# Patient Record
Sex: FEMALE | Race: WHITE | NOT HISPANIC OR LATINO | Employment: OTHER | URBAN - NONMETROPOLITAN AREA
[De-identification: names, ages, dates, MRNs, and addresses within clinical notes are randomized per-mention and may not be internally consistent; named-entity substitution may affect disease eponyms.]

---

## 2024-03-28 ENCOUNTER — OFFICE VISIT (OUTPATIENT)
Dept: CARDIOLOGY | Facility: CLINIC | Age: 79
End: 2024-03-28
Payer: MEDICARE

## 2024-03-28 ENCOUNTER — TELEPHONE (OUTPATIENT)
Dept: CARDIOLOGY | Facility: CLINIC | Age: 79
End: 2024-03-28

## 2024-03-28 VITALS
BODY MASS INDEX: 35.03 KG/M2 | HEIGHT: 66 IN | DIASTOLIC BLOOD PRESSURE: 70 MMHG | SYSTOLIC BLOOD PRESSURE: 170 MMHG | HEART RATE: 67 BPM | WEIGHT: 218 LBS

## 2024-03-28 DIAGNOSIS — E78.2 MIXED HYPERLIPIDEMIA: ICD-10-CM

## 2024-03-28 DIAGNOSIS — I48.91 ATRIAL FIBRILLATION, UNSPECIFIED TYPE (MULTI): ICD-10-CM

## 2024-03-28 DIAGNOSIS — R07.9 CHEST PAIN IN ADULT: ICD-10-CM

## 2024-03-28 DIAGNOSIS — Z78.9 NEVER SMOKED TOBACCO: ICD-10-CM

## 2024-03-28 DIAGNOSIS — R06.02 SHORTNESS OF BREATH: ICD-10-CM

## 2024-03-28 DIAGNOSIS — I10 ESSENTIAL HYPERTENSION: ICD-10-CM

## 2024-03-28 DIAGNOSIS — E44.1: ICD-10-CM

## 2024-03-28 PROBLEM — E78.5 HYPERLIPIDEMIA: Status: ACTIVE | Noted: 2024-03-28

## 2024-03-28 PROCEDURE — 99204 OFFICE O/P NEW MOD 45 MIN: CPT | Performed by: INTERNAL MEDICINE

## 2024-03-28 PROCEDURE — 93000 ELECTROCARDIOGRAM COMPLETE: CPT | Performed by: INTERNAL MEDICINE

## 2024-03-28 PROCEDURE — 1036F TOBACCO NON-USER: CPT | Performed by: INTERNAL MEDICINE

## 2024-03-28 PROCEDURE — 3078F DIAST BP <80 MM HG: CPT | Performed by: INTERNAL MEDICINE

## 2024-03-28 PROCEDURE — 3077F SYST BP >= 140 MM HG: CPT | Performed by: INTERNAL MEDICINE

## 2024-03-28 PROCEDURE — 1159F MED LIST DOCD IN RCRD: CPT | Performed by: INTERNAL MEDICINE

## 2024-03-28 RX ORDER — AMLODIPINE BESYLATE 10 MG/1
10 TABLET ORAL DAILY
Qty: 90 TABLET | Refills: 3 | Status: SHIPPED | OUTPATIENT
Start: 2024-03-28 | End: 2025-03-28

## 2024-03-28 RX ORDER — ASPIRIN 81 MG/1
81 TABLET ORAL DAILY
Qty: 90 TABLET | Refills: 3 | Status: SHIPPED | OUTPATIENT
Start: 2024-03-28 | End: 2025-03-28

## 2024-03-28 RX ORDER — SERTRALINE HYDROCHLORIDE 50 MG/1
50 TABLET, FILM COATED ORAL NIGHTLY
COMMUNITY

## 2024-03-28 RX ORDER — NAPROXEN 500 MG/1
500 TABLET ORAL
COMMUNITY
Start: 2024-03-14

## 2024-03-28 RX ORDER — AMLODIPINE BESYLATE 5 MG/1
5 TABLET ORAL DAILY
COMMUNITY
End: 2024-03-28 | Stop reason: DRUGHIGH

## 2024-03-28 RX ORDER — LISINOPRIL 20 MG/1
20 TABLET ORAL DAILY
COMMUNITY

## 2024-03-28 RX ORDER — OMEPRAZOLE 40 MG/1
40 CAPSULE, DELAYED RELEASE ORAL
COMMUNITY

## 2024-03-28 RX ORDER — TIZANIDINE 4 MG/1
4 TABLET ORAL DAILY PRN
COMMUNITY
Start: 2024-03-14 | End: 2024-06-12

## 2024-03-28 RX ORDER — TRIAMTERENE/HYDROCHLOROTHIAZID 37.5-25 MG
1 TABLET ORAL DAILY
COMMUNITY

## 2024-03-28 ASSESSMENT — ENCOUNTER SYMPTOMS: DYSPNEA ON EXERTION: 1

## 2024-03-28 NOTE — PROGRESS NOTES
Cardiology Consultation- New Consult    Reason for referral: Aortic Atherosclerotic Calcification    HPI: Melania Mora is a 78 y.o. female who is self-referred for evaluation of abnormal imaging study showing calcification of the abdominal aorta.  The patient comes from a family with high prevalence of coronary heart disease, her father had coronary bypass surgery when he was in the 60s.  She has a sister with atrial fibrillation.  She does have longstanding history of hypertension medical therapy.  She is nondiabetic and non-smoker lifetime.  She has significant hyperlipidemia and in the past tried taking 1 statin causing leg pain leading to discontinuation of the medicine.  She denies any symptoms of chest pain on exertion, dyspnea on exertion, palpitations orthopnea PND or lower extremity edema has had no syncope.  I found on the record in 2017 a Lexiscan Cardiolite stress test.  I read the ECG part of the test which revealed atrial fibrillation that was present at baseline.  She was not aware of it.  Apparently this was never addressed by her.  I believe the stress test came back normal at the time since no action was taken.  All other review of system essentially unremarkable the physical examination was remarkable for class I obesity.  Her pressure was elevated today with a systolic blood pressure 170 mmHg.  She seem to have been tolerating hypertension therapy fairly well.    Assessment/recommendations:    1-hypertension that is uncontrolled.  Will increase amlodipine up to 10 mg daily.  2-paroxysmal atrial fibrillation confirmed by a stress test in 2017.  The patient was advised that since she is asymptomatic she is at risk of having thromboembolic events if she is not being treated.  I suggested as a started getting a 30-day event monitor.  If we capture atrial fibrillation she will be advised at least to go on anticoagulation.  3-untreated hyperlipidemia.  Will check a lipid profile and make further  determination  4-patient need to have risk stratification for coronary heart disease.  Coronary calcium score was advised to assess where she stands.  This is in view of the presence of calcification of the aorta based on simple imaging.  5-class II obesity, encouraged the patient to work harder on cutting back on calorie consumption and control her weight with exercise as well.      Past Medical History:   She has no past medical history on file.    Surgical History:   She has a past surgical history that includes Lithotripsy; Cystourethroscopy; Ureteroscopy; and  section, classic.    Family History:   Family History   Problem Relation Name Age of Onset    Other (mini strokes) Mother      Other (CABG) Father      Angina Paternal Grandmother         Social History:   Social History     Tobacco Use    Smoking status: Never    Smokeless tobacco: Never   Substance Use Topics    Alcohol use: Yes     Comment: socially        Allergies:  Morphine and Statins-hmg-coa reductase inhibitors     Current Medications:    Current Outpatient Medications:     lisinopril 20 mg tablet, Take 1 tablet (20 mg) by mouth once daily., Disp: , Rfl:     naproxen (Naprosyn) 500 mg tablet, Take 1 tablet (500 mg) by mouth 2 times a day with meals., Disp: , Rfl:     omeprazole (PriLOSEC) 40 mg DR capsule, Take 1 capsule (40 mg) by mouth once daily in the morning. Take before meals. Do not crush or chew., Disp: , Rfl:     sertraline (Zoloft) 50 mg tablet, Take 1 tablet (50 mg) by mouth once daily at bedtime., Disp: , Rfl:     tiZANidine (Zanaflex) 4 mg tablet, Take 1 tablet (4 mg) by mouth once daily as needed for muscle spasms., Disp: , Rfl:     triamterene-hydrochlorothiazid (Maxzide-25) 37.5-25 mg tablet, Take 1 tablet by mouth once daily., Disp: , Rfl:     amLODIPine (Norvasc) 10 mg tablet, Take 1 tablet (10 mg) by mouth once daily., Disp: 90 tablet, Rfl: 3    aspirin 81 mg EC tablet, Take 1 tablet (81 mg) by mouth once daily., Disp:  "90 tablet, Rfl: 3     Vitals:  Vitals:    03/28/24 1012 03/28/24 1015 03/28/24 1029   BP: 140/86 146/68 170/70   BP Location: Left arm Right arm Left arm   Patient Position: Sitting Sitting Sitting   Pulse: 67     Weight: 98.9 kg (218 lb)     Height: 1.676 m (5' 6\")        EKG done in office today     Review of Systems   Constitutional: Positive for malaise/fatigue.   Cardiovascular:  Positive for dyspnea on exertion.       Objective         Physical Exam  Constitutional:       Appearance: Normal appearance.   HENT:      Nose: Nose normal.   Neck:      Vascular: No carotid bruit.   Cardiovascular:      Rate and Rhythm: Normal rate.      Pulses: Normal pulses.      Heart sounds: Normal heart sounds.   Pulmonary:      Effort: Pulmonary effort is normal.   Abdominal:      General: Bowel sounds are normal.      Palpations: Abdomen is soft.   Musculoskeletal:         General: Normal range of motion.      Cervical back: Normal range of motion.      Right lower leg: No edema.      Left lower leg: No edema.   Skin:     General: Skin is warm and dry.   Neurological:      General: No focal deficit present.      Mental Status: She is alert.   Psychiatric:         Mood and Affect: Mood normal.         Behavior: Behavior normal.         Thought Content: Thought content normal.         Judgment: Judgment normal.                Assessment and Plan:   1. Shortness of breath  ECG 12 Lead    CT cardiac scoring wo IV contrast    aspirin 81 mg EC tablet    Holter Or Event Cardiac Monitor      2. Essential hypertension  Follow Up In Cardiology    ECG 12 Lead    amLODIPine (Norvasc) 10 mg tablet    CT cardiac scoring wo IV contrast    aspirin 81 mg EC tablet    Lipid Panel    Basic Metabolic Panel    Lipid Panel    Basic Metabolic Panel      3. BMI 35.0-35.9,adult        4. Never smoked tobacco        5. Mixed hyperlipidemia  CT cardiac scoring wo IV contrast    aspirin 81 mg EC tablet    Lipid Panel    Basic Metabolic Panel    Lipid " Panel    Basic Metabolic Panel      6. Atrial fibrillation, unspecified type (CMS/HCC)  aspirin 81 mg EC tablet    Holter Or Event Cardiac Monitor             Scribe Attestation  By signing my name below, I, Renato Mantilla LPN   attest that this documentation has been prepared under the direction and in the presence of Dax Steele MD.    Provider Attestation - Scribe documentation    All medical record entries made by the Scribe were at my direction and personally dictated by me. I have reviewed the chart and agree that the record accurately reflects my personal performance of the history, physical exam, discussion and plan.

## 2024-03-28 NOTE — LETTER
March 28, 2024     Carlos Malcolm DO  [address]  [facility] [place] [address]    Patient: Melania Mora   YOB: 1945   Date of Visit: 3/28/2024       Dear Dr. Carlos Malcolm DO:    Thank you for referring Melania Mora to me for evaluation. Below are my notes for this consultation.  If you have questions, please do not hesitate to call me. I look forward to following your patient along with you.       Sincerely,     Dax Steele MD      CC: No Recipients  ______________________________________________________________________________________    Cardiology Consultation- New Consult    Reason for referral: Aortic Atherosclerotic Calcification    HPI: Melania Mora is a 78 y.o. female who is self-referred for evaluation of abnormal imaging study showing calcification of the abdominal aorta.  The patient comes from a family with high prevalence of coronary heart disease, her father had coronary bypass surgery when he was in the 60s.  She has a sister with atrial fibrillation.  She does have longstanding history of hypertension medical therapy.  She is nondiabetic and non-smoker lifetime.  She has significant hyperlipidemia and in the past tried taking 1 statin causing leg pain leading to discontinuation of the medicine.  She denies any symptoms of chest pain on exertion, dyspnea on exertion, palpitations orthopnea PND or lower extremity edema has had no syncope.  I found on the record in 2017 a Lexiscan Cardiolite stress test.  I read the ECG part of the test which revealed atrial fibrillation that was present at baseline.  She was not aware of it.  Apparently this was never addressed by her.  I believe the stress test came back normal at the time since no action was taken.  All other review of system essentially unremarkable the physical examination was remarkable for class I obesity.  Her pressure was elevated today with a systolic blood pressure 170 mmHg.  She seem to have been  tolerating hypertension therapy fairly well.    Assessment/recommendations:    1-hypertension that is uncontrolled.  Will increase amlodipine up to 10 mg daily.  2-paroxysmal atrial fibrillation confirmed by a stress test in 2017.  The patient was advised that since she is asymptomatic she is at risk of having thromboembolic events if she is not being treated.  I suggested as a started getting a 30-day event monitor.  If we capture atrial fibrillation she will be advised at least to go on anticoagulation.  3-untreated hyperlipidemia.  Will check a lipid profile and make further determination  4-patient need to have risk stratification for coronary heart disease.  Coronary calcium score was advised to assess where she stands.  This is in view of the presence of calcification of the aorta based on simple imaging.  5-class II obesity, encouraged the patient to work harder on cutting back on calorie consumption and control her weight with exercise as well.      Past Medical History:   She has no past medical history on file.    Surgical History:   She has a past surgical history that includes Lithotripsy; Cystourethroscopy; Ureteroscopy; and  section, classic.    Family History:   Family History   Problem Relation Name Age of Onset   • Other (mini strokes) Mother     • Other (CABG) Father     • Angina Paternal Grandmother         Social History:   Social History     Tobacco Use   • Smoking status: Never   • Smokeless tobacco: Never   Substance Use Topics   • Alcohol use: Yes     Comment: socially        Allergies:  Morphine and Statins-hmg-coa reductase inhibitors     Current Medications:    Current Outpatient Medications:   •  lisinopril 20 mg tablet, Take 1 tablet (20 mg) by mouth once daily., Disp: , Rfl:   •  naproxen (Naprosyn) 500 mg tablet, Take 1 tablet (500 mg) by mouth 2 times a day with meals., Disp: , Rfl:   •  omeprazole (PriLOSEC) 40 mg DR capsule, Take 1 capsule (40 mg) by mouth once daily in the  "morning. Take before meals. Do not crush or chew., Disp: , Rfl:   •  sertraline (Zoloft) 50 mg tablet, Take 1 tablet (50 mg) by mouth once daily at bedtime., Disp: , Rfl:   •  tiZANidine (Zanaflex) 4 mg tablet, Take 1 tablet (4 mg) by mouth once daily as needed for muscle spasms., Disp: , Rfl:   •  triamterene-hydrochlorothiazid (Maxzide-25) 37.5-25 mg tablet, Take 1 tablet by mouth once daily., Disp: , Rfl:   •  amLODIPine (Norvasc) 10 mg tablet, Take 1 tablet (10 mg) by mouth once daily., Disp: 90 tablet, Rfl: 3  •  aspirin 81 mg EC tablet, Take 1 tablet (81 mg) by mouth once daily., Disp: 90 tablet, Rfl: 3     Vitals:  Vitals:    03/28/24 1012 03/28/24 1015 03/28/24 1029   BP: 140/86 146/68 170/70   BP Location: Left arm Right arm Left arm   Patient Position: Sitting Sitting Sitting   Pulse: 67     Weight: 98.9 kg (218 lb)     Height: 1.676 m (5' 6\")        EKG done in office today     Review of Systems   Constitutional: Positive for malaise/fatigue.   Cardiovascular:  Positive for dyspnea on exertion.       Objective        Physical Exam  Constitutional:       Appearance: Normal appearance.   HENT:      Nose: Nose normal.   Neck:      Vascular: No carotid bruit.   Cardiovascular:      Rate and Rhythm: Normal rate.      Pulses: Normal pulses.      Heart sounds: Normal heart sounds.   Pulmonary:      Effort: Pulmonary effort is normal.   Abdominal:      General: Bowel sounds are normal.      Palpations: Abdomen is soft.   Musculoskeletal:         General: Normal range of motion.      Cervical back: Normal range of motion.      Right lower leg: No edema.      Left lower leg: No edema.   Skin:     General: Skin is warm and dry.   Neurological:      General: No focal deficit present.      Mental Status: She is alert.   Psychiatric:         Mood and Affect: Mood normal.         Behavior: Behavior normal.         Thought Content: Thought content normal.         Judgment: Judgment normal.                Assessment and " Plan:   1. Shortness of breath  ECG 12 Lead    CT cardiac scoring wo IV contrast    aspirin 81 mg EC tablet    Holter Or Event Cardiac Monitor      2. Essential hypertension  Follow Up In Cardiology    ECG 12 Lead    amLODIPine (Norvasc) 10 mg tablet    CT cardiac scoring wo IV contrast    aspirin 81 mg EC tablet    Lipid Panel    Basic Metabolic Panel    Lipid Panel    Basic Metabolic Panel      3. BMI 35.0-35.9,adult        4. Never smoked tobacco        5. Mixed hyperlipidemia  CT cardiac scoring wo IV contrast    aspirin 81 mg EC tablet    Lipid Panel    Basic Metabolic Panel    Lipid Panel    Basic Metabolic Panel      6. Atrial fibrillation, unspecified type (CMS/HCC)  aspirin 81 mg EC tablet    Holter Or Event Cardiac Monitor             Scribe Attestation  By signing my name below, I, Makeda BUCIO LPN  , Sejalibe   attest that this documentation has been prepared under the direction and in the presence of Dax Steele MD.    Provider Attestation - Scribe documentation    All medical record entries made by the Scribe were at my direction and personally dictated by me. I have reviewed the chart and agree that the record accurately reflects my personal performance of the history, physical exam, discussion and plan.

## 2024-03-28 NOTE — PATIENT INSTRUCTIONS
Please bring all medicines, vitamins, and herbal supplements with you when you come to the office.    Prescriptions will not be filled unless you are compliant with your follow up appointments or have a follow up appointment scheduled as per instruction of your physician. Refills should be requested at the time of your visit.     Fall Prevention Education Given     BMI was above normal measurement. Current weight: 98.9 kg (218 lb)  Weight change since last visit (-) denotes wt loss 218 lbs   Weight loss needed to achieve BMI 25: 63.4 Lbs  Weight loss needed to achieve BMI 30: 32.5 Lbs  Provided instructions on dietary changes  Provided instructions on exercise.

## 2024-03-28 NOTE — TELEPHONE ENCOUNTER
Per Verona Huitron, cut off age for Calcium Score is 75, this patient is 78 and is not eligible for this.

## 2024-03-29 NOTE — TELEPHONE ENCOUNTER
Patient advised, verbalized understanding, order updated. Paper order updated as well.     Task sent to  to call and arrange once order signed.

## 2024-04-01 ENCOUNTER — ANCILLARY PROCEDURE (OUTPATIENT)
Dept: CARDIOLOGY | Facility: CLINIC | Age: 79
End: 2024-04-01
Payer: MEDICARE

## 2024-04-01 DIAGNOSIS — R06.02 SHORTNESS OF BREATH: ICD-10-CM

## 2024-04-01 DIAGNOSIS — I48.91 ATRIAL FIBRILLATION, UNSPECIFIED TYPE (MULTI): ICD-10-CM

## 2024-04-01 PROCEDURE — 93272 ECG/REVIEW INTERPRET ONLY: CPT | Performed by: INTERNAL MEDICINE

## 2024-04-09 NOTE — TELEPHONE ENCOUNTER
Message from Ashley Sauceda RN that prior to testing usually pre procedure beta blocker (usually Atenolol 50mg-100mg 2 hrs prior) is ordered.    To Dr. Dax Steele MD to verify

## 2024-04-15 LAB
NON-UH HIE ANION GAP:SCNC:PT:SER/PLAS:QN:: 12 MEQ/L (ref 6–16)
NON-UH HIE CALCIUM:MCNC:PT:SER/PLAS:QN:: 9.9 MG/DL (ref 8.9–11.1)
NON-UH HIE CARBON DIOXIDE:SCNC:PT:SER/PLAS:QN:: 24 MMOL/L (ref 21–31)
NON-UH HIE CHLORIDE:SCNC:PT:SER/PLAS:QN:: 106 MMOL/L (ref 101–111)
NON-UH HIE CHOLESTEROL.IN HDL:MCNC:PT:SER/PLAS:QN:: 64 MG/DL
NON-UH HIE CHOLESTEROL.IN LDL:MCNC:PT:SER/PLAS:QN:: 202 MG/DL
NON-UH HIE CHOLESTEROL.IN VLDL:MCNC:PT:SER/PLAS:QN:CALCULATED: 16 MG/DL (ref 7–40)
NON-UH HIE CHOLESTEROL:MCNC:PT:SER/PLAS:QN:: 277 MG/DL (ref 120–200)
NON-UH HIE CREATININE:MCNC:PT:SER/PLAS:QN:: 0.9 MG/DL (ref 0.5–1.3)
NON-UH HIE EGFR: 66 ML/MIN/1.73 M2
NON-UH HIE GLUCOSE:MCNC:PT:SER/PLAS:QN:: 97 MG/DL (ref 55–199)
NON-UH HIE POTASSIUM:SCNC:PT:SER/PLAS:QN:: 4.1 MMOL/L (ref 3.5–5.3)
NON-UH HIE SODIUM:SCNC:PT:SER/PLAS:QN:: 138 MMOL/L (ref 135–145)
NON-UH HIE TRIGLYCERIDE:MCNC:PT:SER/PLAS:QN:: 82 MG/DL
NON-UH HIE UREA NITROGEN/CREATININE:MRTO:PT:SER/PLAS:QN:: 26 NO UNITS (ref 10–20)
NON-UH HIE UREA NITROGEN:MCNC:PT:SER/PLAS:QN:: 23 MG/DL (ref 5–21)

## 2024-04-18 RX ORDER — ATENOLOL 50 MG/1
50 TABLET ORAL DAILY
Qty: 1 TABLET | Refills: 0 | Status: SHIPPED | OUTPATIENT
Start: 2024-04-18 | End: 2024-04-19

## 2024-04-18 NOTE — TELEPHONE ENCOUNTER
Phoned and spoke with patient. Advised the need for medication prior to testing. Patient verbalized understanding.  RX to go to [facility].    Message sent to Dr. Dax Steele MD to verify what dosage to send in.

## 2024-04-19 ENCOUNTER — HOSPITAL ENCOUNTER (OUTPATIENT)
Dept: RADIOLOGY | Facility: CLINIC | Age: 79
Discharge: HOME | End: 2024-04-19
Payer: MEDICARE

## 2024-04-19 VITALS
DIASTOLIC BLOOD PRESSURE: 65 MMHG | RESPIRATION RATE: 18 BRPM | OXYGEN SATURATION: 99 % | HEART RATE: 48 BPM | SYSTOLIC BLOOD PRESSURE: 143 MMHG

## 2024-04-19 DIAGNOSIS — R93.1 ABNORMAL FINDINGS ON DIAGNOSTIC IMAGING OF HEART AND CORONARY CIRCULATION: ICD-10-CM

## 2024-04-19 DIAGNOSIS — I10 ESSENTIAL (PRIMARY) HYPERTENSION: ICD-10-CM

## 2024-04-19 DIAGNOSIS — R06.02 SHORTNESS OF BREATH: ICD-10-CM

## 2024-04-19 DIAGNOSIS — R07.9 CHEST PAIN IN ADULT: ICD-10-CM

## 2024-04-19 DIAGNOSIS — E78.2 MIXED HYPERLIPIDEMIA: ICD-10-CM

## 2024-04-19 DIAGNOSIS — I10 ESSENTIAL HYPERTENSION: ICD-10-CM

## 2024-04-19 PROCEDURE — 2500000001 HC RX 250 WO HCPCS SELF ADMINISTERED DRUGS (ALT 637 FOR MEDICARE OP): Performed by: INTERNAL MEDICINE

## 2024-04-19 PROCEDURE — 75574 CT ANGIO HRT W/3D IMAGE: CPT

## 2024-04-19 PROCEDURE — 75580 N-INVAS EST C FFR SW ALY CTA: CPT

## 2024-04-19 PROCEDURE — 2550000001 HC RX 255 CONTRASTS: Performed by: INTERNAL MEDICINE

## 2024-04-19 RX ORDER — NITROGLYCERIN 400 UG/1
2 SPRAY ORAL ONCE
Status: COMPLETED | OUTPATIENT
Start: 2024-04-19 | End: 2024-04-19

## 2024-04-19 RX ADMIN — NITROGLYCERIN 2 SPRAY: 400 SPRAY ORAL at 10:18

## 2024-04-19 RX ADMIN — IOHEXOL 80 ML: 350 INJECTION, SOLUTION INTRAVENOUS at 10:35

## 2024-04-19 NOTE — NURSING NOTE
Post CCTA pt denies feeling dizzy or lightheaded, denies headache. Steady on feet, skin warm pink and dry. Pt verbalized understanding of increased water intake x24 hours, educated on side effects of medications. HL removed with tip intact, manual pressure held until hemostasis achieved, 2x2 and coban to site. Pt DC home to self care with family

## 2024-04-25 ENCOUNTER — TELEPHONE (OUTPATIENT)
Dept: CARDIOLOGY | Facility: CLINIC | Age: 79
End: 2024-04-25
Payer: MEDICARE

## 2024-04-25 PROBLEM — R93.1 ELEVATED CORONARY ARTERY CALCIUM SCORE: Status: ACTIVE | Noted: 2024-04-25

## 2024-04-25 PROBLEM — R93.89 ABNORMAL COMPUTED TOMOGRAPHY ANGIOGRAPHY (CTA): Status: ACTIVE | Noted: 2024-04-25

## 2024-04-25 NOTE — TELEPHONE ENCOUNTER
Patient is scheduled for May 8th at 1pm doc time with an arrival time of 11am. Patient is also scheduled for PST on May 6th at 830am.     Called and spoke to patient and she verbalized understanding.

## 2024-04-25 NOTE — TELEPHONE ENCOUNTER
----- Message from Dax Steele MD sent at 4/23/2024 12:14 PM EDT -----  Let her know that the CTA of the coronaries and calcium score both came back significantly abnormal, I recommend proceeding with cardiac catheterization  ----- Message -----  From: Interface, Radiology Results In  Sent: 4/21/2024   1:55 PM EDT  To: Dax Steele MD

## 2024-04-25 NOTE — TELEPHONE ENCOUNTER
Result Communication    Resulted Orders   CT angio coronary art with heartflow if score >30%    Addendum: 4/22/2024    Interpreted By:  Stepan Clements,   ADDENDUM:  Technical: The following is to serve as an over-read for  contrast-enhanced cardiac CT, to evaluate the extra vascular  structures.      Contiguous axial CT sections are performed from level the brittani to  the upper abdomen following the bolus administration of 75 cc of  intravenous Omnipaque 350.              Findings: There is some ill-defined and linear atelectasis in the  lower lobes.      There is no sign of pathologic lymph node enlargement. The heart is  enlarged. There is no pericardial or pleural effusion.      Images through the upper abdomen are unremarkable.      The visualized osseous and soft tissue structures of the chest wall  are intact.              Impression: The extra vascular structures have an unremarkable CT  appearance.      Signed by: Stepan Clements 4/22/2024 7:42 AM      -------- ORIGINAL REPORT --------  Dictation workstation:   [id number]      Narrative    Interpreted By:  Stepan Mota,   STUDY:  CT ANGIO CORONARY ART WITH HEARTFLOW IF SCORE >30%;  4/19/2024 10:37  am      INDICATION:  Signs/Symptoms:SOB, HLD, HTN.      COMPARISON:  None.      ACCESSION NUMBER(S):  [id number]      ORDERING CLINICIAN:  HANNA MCGILL      TECHNIQUE:  Using multi-detector CT technology, Heidi 64-slice scanner, axial,  sequential imaging with retrospective gating was performed of the  chest following the intravenous administration of contrast material.  A low-osmolar contrast agent was used 75 ml of Omnipaque 350. Using  prospective ECG gating, CT scan of the coronary arteries was  performed without intravenous contrast. Coronary calcium scoring was  performed according to the method of Agatston.      In addition, CT-FFR analysis was also performed.       The patient was premedicated with atenolol and 0.4 mg  sublingual  nitroglycerin per protocol for heart rate control and coronary  dilation, respectively.      For optimization of anatomic evaluation, multiplanar reconstruction,  maximum intensity projections, and advanced 3-D off-line  postprocessing were performed on a dedicated stand-alone workstation  under the direct supervision of the interpreting physician.      Independent FFR analysis of Heartflow 3D model was performed by  interpreting physician.          CT Dose-Length Product (DLP):  1101.9 mGy/cm  CT Dose Reduction Employed: Yes iterative reconstruction      FINDINGS:  The left main is normal sized vessel that  bifurcates into the LAD  and circumflex.      There is no significant atherosclerotic change or stenotic disease.      LEFT ANTERIOR DESCENDING ARTERY:  The LAD is a normal size vessel that  wraps around the apex.  Proximal calcified plaque with 50% stenosis. Mid vessel mixed plaque  with 50% stenosis. Proximal FFR 0.94, mid FFR 0.82, 0.80. Distal FFR  0.72, 0.71. There is very graduated decreasing FFR measurements along  the length of the left anterior descending coronary artery that  appears to be more consistent with multiple moderate lesions versus  severe focal stenosis. LAD gives rise to 1 acute diagonal branches.  D1: Normal.      LEFT CIRCUMFLEX ARTERY:  The LCfx is a normal size vessel, which is codominant. In its distal  segment it bifurcates into the  PV branch. Proximal calcified plaque  without significant stenosis. Mid vessel noncalcified plaque without  significant stenosis. LCfx gives rise to 1 obtuse marginal branches.  OM1 proximal mixed plaque with 50-70% luminal stenosis. Mid FFR 0.65  (hemodynamically significant).          RIGHT CORONARY ARTERY:  The RCA is a normal size vessel, which is  codominant.      It gives rise to a conus branch, nitza branch, and 3 acute marginal  branches.  In its distal segment it bifurcates into the PDA branch.      Proximal mixed plaque with 50%  stenosis. Mid vessel mixed plaque with  50% stenosis. Distal noncalcified plaque without significant  stenosis. Proximal FFR 0.88, mid FFR 0.83, distal FFR 0.82  (hemodynamically nonsignificant).      Right PDA proximal noncalcified plaque without significant stenosis.  Left PLV proximal noncalcified plaque without significant stenosis.      Coronary artery calcium score 590, 87th percentile for age, gender,  and race in asymptomatic patients.      CARDIAC CHAMBERS:  The cardiac chambers demonstrate normal atrioventricular and  ventriculoarterial concordance, and systemic and pulmonary venous  return.      LEFT VENTRICLE:  Normal size End diastolic volume 146 ml, 68 ml/m2      LEFT VENTRICLE MASS:  131 gm, 61 gm/m2      RIGHT VENTRICLE:  Normal size End diastolic volume 157 ml, 73 ml/m2      LEFT ATRIUM:  Normal size End systolic volume 117 ml, 55 ml/m2      RIGHT ATRIUM:  Normal size End systolic volume 188 ml, 88 ml/m2      INTERATRIAL SEPTUM:  Intact.      AORTIC VALVE:  The aortic valve is  trileaflet in morphology. No calcifications.      MITRAL VALVE:  No thickening/calcification.      THORACIC AORTA:  The visualized thoracic aorta is normal in course, caliber, and  contour.      There is no acute aortic pathology, such as dissection, intramural  hematoma, or contained rupture.      The aortic arch is not included on this examination.      PERICARDIUM:  There is no pericardial effusion of thickening.          FRACTIONAL FLOW RESERVE      LEFT ANTERIOR DESCENDING: Proximal 0.94, mid 0.82, 0.80, distal 0.72,  0.71 D1: Proximal 0.96          LEFT CIRCUMFLEX: Proximal 1.0, mid 0.96, distal 0.91  OM1: Proximal 0.98, mid 0.65, distal 0.60      RIGHT CORONARY ARTERY: Proximal 0.98, mid 0.83, distal 0.82      PLV: Proximal 0.88            Impression    1. Significant stenosis of proximal OM1 with mixed plaque. Mid FFR  0.65 (hemodynamically significant).  2. Proximal left anterior descending coronary artery calcified  plaque  with 50% stenosis. Mid vessel mixed plaque with 50% stenosis.  Proximal FFR 0.94, mid FFR 0.82, 0.80. Distal FFR 0.72, 0.71. There  is very graduated decreasing FFR measurements along the length of the  left anterior descending coronary artery that appears to be more  consistent with multiple moderate lesions versus severe focal  stenosis.  3. Proximal codominant RCA mixed plaque with 50% stenosis. Mid RCA  mixed plaque with 50% stenosis. Distal FFR 0.82 (hemodynamically  nonsignificant).  4. Mild diffuse coronary artery disease of the remaining coronary  arteries without significant stenosis.  5. Coronary calcium score 590, 87th percentile for age, gender, and  race in asymptomatic patients.      Reading Cardiologist: Dr. Stepan Mota, Date:  4/21/2024  1:47 pm      Signed by: Stepan Mota 4/21/2024 1:54 PM  Dictation workstation:   [id number]       1:20 PM      Results were successfully communicated with the patient and they acknowledged their understanding. No allergy to dye, not diabetic, will continue on her current medications prior to procedure. Form completed and placed on Dr. Dax Steele MD desk to sign.    Task sent to  to call and arrange once order signed.

## 2024-04-26 ENCOUNTER — TELEPHONE (OUTPATIENT)
Dept: CARDIOLOGY | Facility: CLINIC | Age: 79
End: 2024-04-26
Payer: MEDICARE

## 2024-04-26 NOTE — TELEPHONE ENCOUNTER
L Heart cath [id number] DX:R06.02/R93.89/R93.1 is denied. Peer to Peer may be done by calling [facility] at [phone]. Call ref#-[id number] [id number] 4/26/24.

## 2024-04-26 NOTE — TELEPHONE ENCOUNTER
Alexa, nurse reviewer at [facility] called and asked several questions regarding the patient. She said a Peer to Peer no longer needs to be done and that the case meets clinical criteria. She said if the patient needs a PCI during the procedure, that the provider would need to send a post service clinical review within 10 days. Auth# is [id number] and is valid 4/26/24-7/24/24.

## 2024-05-06 LAB
NON-UH HIE ANION GAP: 12.4 (ref 6–15)
NON-UH HIE BASOPHILS # (AUTO): 0.1 10*3/UL (ref 0–0.2)
NON-UH HIE BASOPHILS % (AUTO): 1.1 %
NON-UH HIE BLOOD UREA NITROGEN: 18 MG/DL (ref 7–25)
NON-UH HIE CARBON DIOXIDE: 27.5 MMOL/L (ref 21–31)
NON-UH HIE CHLORIDE: 105 MMOL/L (ref 98–107)
NON-UH HIE CHOL/HDL RATIO: 4.4
NON-UH HIE CHOLESTEROL: 308 MG/DL (ref 140–200)
NON-UH HIE CREATININE: 0.83 MG/DL (ref 0.6–1.2)
NON-UH HIE EOSINOPHILS # (AUTO): 0.2 10*3/UL (ref 0–0.45)
NON-UH HIE EOSINOPHILS % (AUTO): 3.5 %
NON-UH HIE ESTIMATED GFR: > 60
NON-UH HIE HDL CHOLESTEROL: 70 MG/DL (ref 23–92)
NON-UH HIE HEMATOCRIT: 39.8 % (ref 34–46.4)
NON-UH HIE HEMOGLOBIN: 13.4 G/DL (ref 11.8–15.4)
NON-UH HIE INR: 0.9
NON-UH HIE LDL CHOLESTEROL,CALCULATED: 208 MG/DL (ref 0–100)
NON-UH HIE LYMPHOCYTES # (AUTO): 1.3 10*3/UL (ref 1–4.8)
NON-UH HIE LYMPHOCYTES % (AUTO): 20.1 %
NON-UH HIE MEAN CORPUSCULAR HEMOGLOBIN: 30 PG (ref 24.7–34.3)
NON-UH HIE MEAN CORPUSCULAR HGB CONC: 33.7 G/DL (ref 32–35)
NON-UH HIE MEAN CORPUSCULAR VOLUME: 88.9 FL (ref 80–100)
NON-UH HIE MEAN PLATELET VOLUME: 8.5 FL (ref 6.3–10.7)
NON-UH HIE MONOCYTES # (AUTO): 0.4 10*3/UL (ref 0–0.8)
NON-UH HIE MONOCYTES % (AUTO): 6.6 %
NON-UH HIE NEUTROPHILS # (AUTO): 4.3 10*3/UL (ref 1.8–7.7)
NON-UH HIE NEUTROPHILS % (AUTO): 68.7 %
NON-UH HIE NRBC%: 0 /100{WBC} (ref 0–0.5)
NON-UH HIE PARTIAL THROMBOPLASTIN TIME: 29 S (ref 25.1–36.5)
NON-UH HIE PLATELET COUNT: 240 10*3/UL (ref 150–450)
NON-UH HIE POTASSIUM: 3.9 MMOL/L (ref 3.5–5.1)
NON-UH HIE PROTHROMBIN TIME: 10.9 S (ref 9–12.9)
NON-UH HIE RED BLOOD COUNT: 4.47 (ref 3.6–5)
NON-UH HIE RED CELL DISTRIBUTION WIDTH: 13.3 % (ref 11.9–15.3)
NON-UH HIE SODIUM: 141 MMOL/L (ref 136–145)
NON-UH HIE TRIGLYCERIDE W/REFLEX: 148 MG/DL (ref 0–149)
NON-UH HIE UNCORRECTED WBC: 6.3 10*3/UL (ref 3.8–11.6)
NON-UH HIE VLDL CHOLESTEROL: 29 MG/DL
NON-UH HIE WHITE BLOOD COUNT: 6.3 10*3/UL (ref 3.8–11.6)

## 2024-05-07 ENCOUNTER — TELEPHONE (OUTPATIENT)
Dept: CARDIOLOGY | Facility: CLINIC | Age: 79
End: 2024-05-07
Payer: MEDICARE

## 2024-05-07 NOTE — TELEPHONE ENCOUNTER
----- Message from Dax Steele MD sent at 5/6/2024 12:38 PM EDT -----  Lipids are very high, start crestor 40 mg/d  ----- Message -----  From: Beata Argueta - Lab Results In  Sent: 5/6/2024  12:01 PM EDT  To: Dax Steele MD

## 2024-05-08 ENCOUNTER — TELEPHONE (OUTPATIENT)
Dept: CARDIOLOGY | Facility: CLINIC | Age: 79
End: 2024-05-08
Payer: MEDICARE

## 2024-05-08 DIAGNOSIS — E78.2 MIXED HYPERLIPIDEMIA: ICD-10-CM

## 2024-05-08 RX ORDER — ROSUVASTATIN CALCIUM 40 MG/1
40 TABLET, COATED ORAL NIGHTLY
Qty: 90 TABLET | Refills: 3 | Status: SHIPPED | OUTPATIENT
Start: 2024-05-08 | End: 2025-05-08

## 2024-08-22 ENCOUNTER — APPOINTMENT (OUTPATIENT)
Dept: CARDIOLOGY | Facility: CLINIC | Age: 79
End: 2024-08-22
Payer: MEDICARE

## 2024-08-29 LAB
NON-UH HIE CHOLESTEROL.IN HDL:MCNC:PT:SER/PLAS:QN:: 70 MG/DL
NON-UH HIE CHOLESTEROL.IN LDL:MCNC:PT:SER/PLAS:QN:: 96 MG/DL
NON-UH HIE CHOLESTEROL.IN VLDL:MCNC:PT:SER/PLAS:QN:CALCULATED: 17 MG/DL (ref 7–40)
NON-UH HIE CHOLESTEROL:MCNC:PT:SER/PLAS:QN:: 173 MG/DL (ref 120–200)
NON-UH HIE TRIGLYCERIDE:MCNC:PT:SER/PLAS:QN:: 86 MG/DL

## 2024-08-30 ENCOUNTER — APPOINTMENT (OUTPATIENT)
Dept: CARDIOLOGY | Facility: CLINIC | Age: 79
End: 2024-08-30
Payer: MEDICARE

## 2024-09-04 ENCOUNTER — TELEPHONE (OUTPATIENT)
Dept: CARDIOLOGY | Facility: CLINIC | Age: 79
End: 2024-09-04
Payer: MEDICARE

## 2024-09-04 NOTE — TELEPHONE ENCOUNTER
Patient phoned that she stopped all of her meds due to increased fatigue and shortness of breath. States her heart rate is now in the 40s and feels worse. Advised patient that her meds should be taken as ordered but states she will not. States she is on no meds currently. POV 1/2/2025. Please advise.    To Dr. Lobo Weiner MD in absence of Dr. Dax Steele MD

## 2024-09-10 NOTE — TELEPHONE ENCOUNTER
Patient phones back stating she continues to experience shortness of breath, fatigue, dizziness, and some confusion. She feels this is attributed to her current medications. She states she stopped taking some and has since resumed them. Instructed to go to ER if symptoms continue to worsen. Patient is requesting sooner OV. Please advise

## 2024-09-18 ENCOUNTER — OFFICE VISIT (OUTPATIENT)
Dept: CARDIOLOGY | Facility: CLINIC | Age: 79
End: 2024-09-18
Payer: MEDICARE

## 2024-09-18 VITALS
DIASTOLIC BLOOD PRESSURE: 58 MMHG | HEIGHT: 66 IN | SYSTOLIC BLOOD PRESSURE: 128 MMHG | WEIGHT: 228 LBS | BODY MASS INDEX: 36.64 KG/M2 | HEART RATE: 64 BPM

## 2024-09-18 DIAGNOSIS — Z79.01 LONG TERM CURRENT USE OF ANTICOAGULANT THERAPY: ICD-10-CM

## 2024-09-18 DIAGNOSIS — I48.0 PAROXYSMAL ATRIAL FIBRILLATION (MULTI): Primary | ICD-10-CM

## 2024-09-18 DIAGNOSIS — R93.1 ELEVATED CORONARY ARTERY CALCIUM SCORE: ICD-10-CM

## 2024-09-18 DIAGNOSIS — R53.83 FATIGUE, UNSPECIFIED TYPE: ICD-10-CM

## 2024-09-18 DIAGNOSIS — E78.2 MIXED HYPERLIPIDEMIA: ICD-10-CM

## 2024-09-18 DIAGNOSIS — I25.10 CORONARY ARTERY DISEASE INVOLVING NATIVE CORONARY ARTERY OF NATIVE HEART WITHOUT ANGINA PECTORIS: ICD-10-CM

## 2024-09-18 DIAGNOSIS — E66.9 CLASS 2 OBESITY: Chronic | ICD-10-CM

## 2024-09-18 DIAGNOSIS — Z78.9 NEVER SMOKED TOBACCO: ICD-10-CM

## 2024-09-18 DIAGNOSIS — I10 ESSENTIAL HYPERTENSION: ICD-10-CM

## 2024-09-18 PROCEDURE — 93000 ELECTROCARDIOGRAM COMPLETE: CPT | Performed by: INTERNAL MEDICINE

## 2024-09-18 PROCEDURE — 3078F DIAST BP <80 MM HG: CPT | Performed by: INTERNAL MEDICINE

## 2024-09-18 PROCEDURE — 1159F MED LIST DOCD IN RCRD: CPT | Performed by: INTERNAL MEDICINE

## 2024-09-18 PROCEDURE — 1036F TOBACCO NON-USER: CPT | Performed by: INTERNAL MEDICINE

## 2024-09-18 PROCEDURE — 99214 OFFICE O/P EST MOD 30 MIN: CPT | Performed by: INTERNAL MEDICINE

## 2024-09-18 PROCEDURE — 3074F SYST BP LT 130 MM HG: CPT | Performed by: INTERNAL MEDICINE

## 2024-09-18 RX ORDER — ATENOLOL 25 MG/1
25 TABLET ORAL DAILY
COMMUNITY
End: 2024-09-18 | Stop reason: ALTCHOICE

## 2024-09-18 RX ORDER — ASPIRIN 81 MG/1
81 TABLET ORAL 2 TIMES WEEKLY
Start: 2024-09-19 | End: 2025-09-19

## 2024-09-18 RX ORDER — GLUCOSAM/CHONDRO/HERB 149/HYAL 750-100 MG
1 TABLET ORAL 3 TIMES DAILY
COMMUNITY

## 2024-09-18 RX ORDER — RIVAROXABAN 20 MG/1
20 TABLET, FILM COATED ORAL DAILY
COMMUNITY
Start: 2024-09-17

## 2024-09-18 RX ORDER — DRONEDARONE 400 MG/1
400 TABLET, FILM COATED ORAL EVERY 12 HOURS
COMMUNITY

## 2024-09-18 ASSESSMENT — ENCOUNTER SYMPTOMS
DYSPNEA ON EXERTION: 1
DIZZINESS: 1
LIGHT-HEADEDNESS: 1
NAUSEA: 1

## 2024-09-18 NOTE — PROGRESS NOTES
Subjective   Melania Mora is a 79 y.o. female       Chief Complaint    Follow-up          HPI   Patient is in the office at her request to discuss recent symptoms she has been experiencing.  She had evaluation by myself back in May 2020 for with cardiac catheterization revealing moderate disease in left circumflex and LAD with small nondominant vessel.  No intervention was needed but aggressive medical therapy was initiated.  The combination of ezetimibe and rosuvastatin led to a marked drop in her LDL cholesterol from over 200 mg/dL down to 94 mg/dL without any significant side effects.  The medical therapy initiated for hypertension was very effective and her blood pressure is under control.  She complains of fatigue which could be related to atenolol.  She has no myalgias, no chest pain and no palpitations.  She also had event monitor since her last visit revealing intermittent atrial fibrillation for which she was started on anticoagulation and on Multaq and has had no recurrent events.  Her weight is class II obesity and encouragement for weight loss was provided.  EKG today revealed normal sinus rhythm with no significant abnormalities.    Assessment/recommendations:     1-essential hypertension, currently under control on medical therapy as noted.  Due to fatigue we will wean off the atenolol..  2-paroxysmal atrial fibrillation confirmed by event monitor for which she has been on Multaq which has been well-tolerated along with anticoagulation with Xarelto..  3-severe dyslipidemia with excellent response to combination of rosuvastatin and ezetimibe.  Well-tolerated with no side effects.  4-moderate coronary atherosclerosis based on cardiac catheterization May 2024.  Continue antiplatelet therapy and aggressive risk factors modification which have been very effective and the patient remains asymptomatic  5-class II obesity, encouraged the patient to work harder on cutting back on calorie consumption and  "control her weight with exercise as well.  6-high risk medication with antiarrhythmics and anticoagulants to be monitored  7-fatigue likely caused by beta-blocker therapy which will be withdrawn.  Review of Systems   Constitutional: Positive for malaise/fatigue.   Cardiovascular:  Positive for dyspnea on exertion.   Gastrointestinal:  Positive for nausea.   Neurological:  Positive for dizziness and light-headedness.   All other systems reviewed and are negative.           Vitals:    09/18/24 1340   BP: 128/58   BP Location: Left arm   Patient Position: Sitting   Pulse: 64   Weight: 103 kg (228 lb)   Height: 1.676 m (5' 6\")      EKG done in office today    Objective   Physical Exam  Constitutional:       Appearance: Normal appearance.   HENT:      Nose: Nose normal.   Neck:      Vascular: No carotid bruit.   Cardiovascular:      Rate and Rhythm: Normal rate.      Pulses: Normal pulses.      Heart sounds: Normal heart sounds.   Pulmonary:      Effort: Pulmonary effort is normal.   Abdominal:      General: Bowel sounds are normal.      Palpations: Abdomen is soft.   Musculoskeletal:         General: Normal range of motion.      Cervical back: Normal range of motion.      Right lower leg: No edema.      Left lower leg: No edema.   Skin:     General: Skin is warm and dry.   Neurological:      General: No focal deficit present.      Mental Status: She is alert.   Psychiatric:         Mood and Affect: Mood normal.         Behavior: Behavior normal.         Thought Content: Thought content normal.         Judgment: Judgment normal.         Allergies  Morphine and Statins-hmg-coa reductase inhibitors     Current Medications    Current Outpatient Medications:     amLODIPine (Norvasc) 10 mg tablet, Take 1 tablet (10 mg) by mouth once daily., Disp: 90 tablet, Rfl: 3    aspirin 81 mg EC tablet, Take 1 tablet (81 mg) by mouth once daily., Disp: 90 tablet, Rfl: 3    lisinopril 20 mg tablet, Take 1 tablet (20 mg) by mouth once " daily., Disp: , Rfl:     Multaq 400 mg tablet, Take 1 tablet (400 mg) by mouth every 12 hours. Take with food., Disp: , Rfl:     omega 3-dha-epa-fish oil (Fish OiL) 1,000 (120-180) mg capsule, Take 1 capsule (1,000 mg) by mouth 3 times a day., Disp: , Rfl:     rosuvastatin (Crestor) 40 mg tablet, Take 1 tablet (40 mg) by mouth once daily at bedtime., Disp: 90 tablet, Rfl: 3    triamterene-hydrochlorothiazid (Maxzide-25) 37.5-25 mg tablet, Take 1 tablet by mouth once daily., Disp: , Rfl:     Xarelto 20 mg tablet, 1 tablet (20 mg) early in the morning.., Disp: , Rfl:     tiZANidine (Zanaflex) 4 mg tablet, Take 1 tablet (4 mg) by mouth once daily as needed for muscle spasms., Disp: , Rfl:                      Assessment/Plan   1. Essential hypertension  Follow Up In Cardiology      2. Atrial fibrillation, unspecified type (Multi)        3. Mixed hyperlipidemia        4. Never smoked tobacco        5. BMI 36.0-36.9,adult        6. Fatigue, unspecified type        7. Long term current use of anticoagulant therapy        8. Shortness of breath        9. Elevated coronary artery calcium score                 Scribe Attestation  By signing my name below, Vijaya MIN LPN, Scribe   attest that this documentation has been prepared under the direction and in the presence of Dax Steele MD.     Provider Attestation - Scribe documentation    All medical record entries made by the Scribe were at my direction and personally dictated by me. I have reviewed the chart and agree that the record accurately reflects my personal performance of the history, physical exam, discussion and plan.

## 2024-09-18 NOTE — PATIENT INSTRUCTIONS
Please bring all medicines, vitamins, and herbal supplements with you when you come to the office.    Prescriptions will not be filled unless you are compliant with your follow up appointments or have a follow up appointment scheduled as per instruction of your physician. Refills should be requested at the time of your visit.     Fall Prevention Education Given     BMI was above normal measurement. Current weight: 103 kg (228 lb)  Weight change since last visit (-) denotes wt loss 10 lbs   Weight loss needed to achieve BMI 25: 73.4 Lbs  Weight loss needed to achieve BMI 30: 42.5 Lbs  Provided instructions on dietary changes  Provided instructions on exercise.    Wean off of Atenolol; one tab every other day for one week then stop.  Take Aspirin only 2 times weekly.    Update office with any worsening symptoms of fatigue or nausea.

## 2024-09-18 NOTE — LETTER
September 18, 2024     Carlos Malcolm DO  [address]  [facility] [address]    Patient: Melania Mora   YOB: 1945   Date of Visit: 9/18/2024       Dear Dr. Carlos Malcolm DO:    Thank you for referring Melania Mora to me for evaluation. Below are my notes for this consultation.  If you have questions, please do not hesitate to call me. I look forward to following your patient along with you.       Sincerely,     Dax Steele MD      CC: No Recipients  ______________________________________________________________________________________    Subjective   Melania Mora is a 79 y.o. female       Chief Complaint    Follow-up          HPI   Patient is in the office at her request to discuss recent symptoms she has been experiencing.  She had evaluation by myself back in May 2020 for with cardiac catheterization revealing moderate disease in left circumflex and LAD with small nondominant vessel.  No intervention was needed but aggressive medical therapy was initiated.  The combination of ezetimibe and rosuvastatin led to a marked drop in her LDL cholesterol from over 200 mg/dL down to 94 mg/dL without any significant side effects.  The medical therapy initiated for hypertension was very effective and her blood pressure is under control.  She complains of fatigue which could be related to atenolol.  She has no myalgias, no chest pain and no palpitations.  She also had event monitor since her last visit revealing intermittent atrial fibrillation for which she was started on anticoagulation and on Multaq and has had no recurrent events.  Her weight is class II obesity and encouragement for weight loss was provided.  EKG today revealed normal sinus rhythm with no significant abnormalities.    Assessment/recommendations:     1-essential hypertension, currently under control on medical therapy as noted.  Due to fatigue we will wean off the atenolol..  2-paroxysmal atrial fibrillation  "confirmed by event monitor for which she has been on Multaq which has been well-tolerated along with anticoagulation with Xarelto..  3-severe dyslipidemia with excellent response to combination of rosuvastatin and ezetimibe.  Well-tolerated with no side effects.  4-moderate coronary atherosclerosis based on cardiac catheterization May 2024.  Continue antiplatelet therapy and aggressive risk factors modification which have been very effective and the patient remains asymptomatic  5-class II obesity, encouraged the patient to work harder on cutting back on calorie consumption and control her weight with exercise as well.  6-high risk medication with antiarrhythmics and anticoagulants to be monitored  7-fatigue likely caused by beta-blocker therapy which will be withdrawn.  Review of Systems   Constitutional: Positive for malaise/fatigue.   Cardiovascular:  Positive for dyspnea on exertion.   Gastrointestinal:  Positive for nausea.   Neurological:  Positive for dizziness and light-headedness.   All other systems reviewed and are negative.           Vitals:    09/18/24 1340   BP: 128/58   BP Location: Left arm   Patient Position: Sitting   Pulse: 64   Weight: 103 kg (228 lb)   Height: 1.676 m (5' 6\")      EKG done in office today    Objective   Physical Exam  Constitutional:       Appearance: Normal appearance.   HENT:      Nose: Nose normal.   Neck:      Vascular: No carotid bruit.   Cardiovascular:      Rate and Rhythm: Normal rate.      Pulses: Normal pulses.      Heart sounds: Normal heart sounds.   Pulmonary:      Effort: Pulmonary effort is normal.   Abdominal:      General: Bowel sounds are normal.      Palpations: Abdomen is soft.   Musculoskeletal:         General: Normal range of motion.      Cervical back: Normal range of motion.      Right lower leg: No edema.      Left lower leg: No edema.   Skin:     General: Skin is warm and dry.   Neurological:      General: No focal deficit present.      Mental Status: " She is alert.   Psychiatric:         Mood and Affect: Mood normal.         Behavior: Behavior normal.         Thought Content: Thought content normal.         Judgment: Judgment normal.         Allergies  Morphine and Statins-hmg-coa reductase inhibitors     Current Medications    Current Outpatient Medications:   •  amLODIPine (Norvasc) 10 mg tablet, Take 1 tablet (10 mg) by mouth once daily., Disp: 90 tablet, Rfl: 3  •  aspirin 81 mg EC tablet, Take 1 tablet (81 mg) by mouth once daily., Disp: 90 tablet, Rfl: 3  •  lisinopril 20 mg tablet, Take 1 tablet (20 mg) by mouth once daily., Disp: , Rfl:   •  Multaq 400 mg tablet, Take 1 tablet (400 mg) by mouth every 12 hours. Take with food., Disp: , Rfl:   •  omega 3-dha-epa-fish oil (Fish OiL) 1,000 (120-180) mg capsule, Take 1 capsule (1,000 mg) by mouth 3 times a day., Disp: , Rfl:   •  rosuvastatin (Crestor) 40 mg tablet, Take 1 tablet (40 mg) by mouth once daily at bedtime., Disp: 90 tablet, Rfl: 3  •  triamterene-hydrochlorothiazid (Maxzide-25) 37.5-25 mg tablet, Take 1 tablet by mouth once daily., Disp: , Rfl:   •  Xarelto 20 mg tablet, 1 tablet (20 mg) early in the morning.., Disp: , Rfl:   •  tiZANidine (Zanaflex) 4 mg tablet, Take 1 tablet (4 mg) by mouth once daily as needed for muscle spasms., Disp: , Rfl:                      Assessment/Plan   1. Essential hypertension  Follow Up In Cardiology      2. Atrial fibrillation, unspecified type (Multi)        3. Mixed hyperlipidemia        4. Never smoked tobacco        5. BMI 36.0-36.9,adult        6. Fatigue, unspecified type        7. Long term current use of anticoagulant therapy        8. Shortness of breath        9. Elevated coronary artery calcium score                 Scribe Attestation  By signing my name below, Vijaya MIN LPN, Scribe   attest that this documentation has been prepared under the direction and in the presence of Dax Steele MD.     Provider Attestation - Scribe  documentation    All medical record entries made by the Scribe were at my direction and personally dictated by me. I have reviewed the chart and agree that the record accurately reflects my personal performance of the history, physical exam, discussion and plan.

## 2025-01-02 ENCOUNTER — APPOINTMENT (OUTPATIENT)
Dept: CARDIOLOGY | Facility: CLINIC | Age: 80
End: 2025-01-02
Payer: MEDICARE

## 2025-01-02 VITALS
HEART RATE: 60 BPM | DIASTOLIC BLOOD PRESSURE: 72 MMHG | SYSTOLIC BLOOD PRESSURE: 124 MMHG | BODY MASS INDEX: 37.64 KG/M2 | HEIGHT: 66 IN | WEIGHT: 234.2 LBS

## 2025-01-02 DIAGNOSIS — I10 ESSENTIAL HYPERTENSION: ICD-10-CM

## 2025-01-02 DIAGNOSIS — E66.812 CLASS 2 OBESITY: ICD-10-CM

## 2025-01-02 DIAGNOSIS — R93.1 ELEVATED CORONARY ARTERY CALCIUM SCORE: ICD-10-CM

## 2025-01-02 DIAGNOSIS — R53.83 OTHER FATIGUE: ICD-10-CM

## 2025-01-02 DIAGNOSIS — G47.30 SLEEP APNEA, UNSPECIFIED TYPE: ICD-10-CM

## 2025-01-02 DIAGNOSIS — I48.0 PAROXYSMAL ATRIAL FIBRILLATION (MULTI): Primary | ICD-10-CM

## 2025-01-02 DIAGNOSIS — E78.2 MIXED HYPERLIPIDEMIA: ICD-10-CM

## 2025-01-02 DIAGNOSIS — I25.10 CORONARY ARTERY DISEASE INVOLVING NATIVE CORONARY ARTERY OF NATIVE HEART WITHOUT ANGINA PECTORIS: ICD-10-CM

## 2025-01-02 DIAGNOSIS — Z79.01 LONG TERM CURRENT USE OF ANTICOAGULANT THERAPY: ICD-10-CM

## 2025-01-02 DIAGNOSIS — Z79.899 HIGH RISK MEDICATION USE: ICD-10-CM

## 2025-01-02 PROCEDURE — 3078F DIAST BP <80 MM HG: CPT | Performed by: INTERNAL MEDICINE

## 2025-01-02 PROCEDURE — 93000 ELECTROCARDIOGRAM COMPLETE: CPT | Performed by: INTERNAL MEDICINE

## 2025-01-02 PROCEDURE — 1159F MED LIST DOCD IN RCRD: CPT | Performed by: INTERNAL MEDICINE

## 2025-01-02 PROCEDURE — 3074F SYST BP LT 130 MM HG: CPT | Performed by: INTERNAL MEDICINE

## 2025-01-02 PROCEDURE — 1036F TOBACCO NON-USER: CPT | Performed by: INTERNAL MEDICINE

## 2025-01-02 PROCEDURE — 99214 OFFICE O/P EST MOD 30 MIN: CPT | Performed by: INTERNAL MEDICINE

## 2025-01-02 RX ORDER — ACETAMINOPHEN 325 MG/1
325 TABLET ORAL EVERY 6 HOURS PRN
COMMUNITY

## 2025-01-02 RX ORDER — EZETIMIBE 10 MG/1
1 TABLET ORAL
COMMUNITY
Start: 2024-09-30

## 2025-01-02 RX ORDER — SERTRALINE HYDROCHLORIDE 50 MG/1
50 TABLET, FILM COATED ORAL DAILY
COMMUNITY

## 2025-01-02 NOTE — PATIENT INSTRUCTIONS
Please bring all medicines, vitamins, and herbal supplements with you when you come to the office.    Prescriptions will not be filled unless you are compliant with your follow up appointments or have a follow up appointment scheduled as per instruction of your physician. Refills should be requested at the time of your visit.     BMI was above normal measurement. Current weight: 106 kg (234 lb 3.2 oz)  Weight change since last visit (-) denotes wt loss 6.2 lbs   Weight loss needed to achieve BMI 25: 79.6 Lbs  Weight loss needed to achieve BMI 30: 48.7 Lbs  Provided instructions on dietary changes.

## 2025-01-02 NOTE — LETTER
January 2, 2025     Carlos Malcolm DO  [address]  [facility] [address]    Patient: Melania Mora   YOB: 1945   Date of Visit: 1/2/2025       Dear Dr. Carlos Malcolm DO:    Thank you for referring Melania Mora to me for evaluation. Below are my notes for this consultation.  If you have questions, please do not hesitate to call me. I look forward to following your patient along with you.       Sincerely,     Dax Steele MD      CC: No Recipients  ______________________________________________________________________________________    Subjective   Melania Mora is a 79 y.o. female       Chief Complaint    Follow-up          HPI   Patient is in the office for follow-up for the problems noted below.  Earlier in 2024 she had a cardiac catheterization that revealed moderate disease in the coronary arteries with no high-grade stenosis that needed intervention.  Since her last visit and stopping the beta-blocker therapy her fatigue has improved but she continued to have symptoms suggestive of sleep apnea for which I suggested sleep study.  She is scheduled for blood work in the near future.  She is complaining of bulging disks in her lower back and MRI is scheduled.  Pain management is on the case.  Her weight has been increasing since she has been unable to exercise.  Her blood pressure is under control.  There has been no indication of breakthrough atrial fibrillation while she is on Multaq.  Her blood pressure is under control.  Her medical therapy has been well-tolerated, there has been no bleeding complications on anticoagulation with Xarelto.    Assessment/recommendations:     1-essential hypertension, currently under control on medical therapy as noted.   2-paroxysmal atrial fibrillation confirmed by event monitor for which she has been on Multaq which has been well-tolerated along with anticoagulation with Xarelto..  3-severe dyslipidemia with excellent response to  "combination of rosuvastatin and ezetimibe.  Well-tolerated with no side effects.  Lipid profile is scheduled  4-moderate coronary atherosclerosis based on cardiac catheterization May 2024.  Continue antiplatelet therapy and aggressive risk factors modification which have been very effective and the patient remains asymptomatic  5-class II obesity, encouraged the patient to work harder on cutting back on calorie consumption and control her weight with exercise as well.  6-high risk medication with antiarrhythmics and anticoagulants to be monitored  7-fatigue, somnolence and snoring all of which increase the probability of obstructive sleep apnea for which sleep study at home was suggested  Review of Systems   All other systems reviewed and are negative.           Vitals:    01/02/25 0937   BP: 124/72   BP Location: Left arm   Patient Position: Sitting   Pulse: 60   Weight: 106 kg (234 lb 3.2 oz)   Height: 1.676 m (5' 6\")      EKG done in office today   Objective   Physical Exam  Constitutional:       Appearance: Normal appearance.   HENT:      Nose: Nose normal.   Neck:      Vascular: No carotid bruit.   Cardiovascular:      Rate and Rhythm: Normal rate.      Pulses: Normal pulses.      Heart sounds: Normal heart sounds.   Pulmonary:      Effort: Pulmonary effort is normal.   Abdominal:      General: Bowel sounds are normal.      Palpations: Abdomen is soft.   Musculoskeletal:         General: Normal range of motion.      Cervical back: Normal range of motion.      Right lower leg: No edema.      Left lower leg: No edema.   Skin:     General: Skin is warm and dry.   Neurological:      General: No focal deficit present.      Mental Status: She is alert.   Psychiatric:         Mood and Affect: Mood normal.         Behavior: Behavior normal.         Thought Content: Thought content normal.         Judgment: Judgment normal.         Allergies  Morphine and Statins-hmg-coa reductase inhibitors     Current " Medications    Current Outpatient Medications:   •  acetaminophen (Tylenol) 325 mg tablet, Take 1 tablet (325 mg) by mouth every 6 hours if needed for mild pain (1 - 3)., Disp: , Rfl:   •  amLODIPine (Norvasc) 10 mg tablet, Take 1 tablet (10 mg) by mouth once daily., Disp: 90 tablet, Rfl: 3  •  aspirin 81 mg EC tablet, Take 1 tablet (81 mg) by mouth 2 times a week., Disp: , Rfl:   •  ezetimibe (Zetia) 10 mg tablet, Take 1 tablet (10 mg) by mouth early in the morning.., Disp: , Rfl:   •  lisinopril 20 mg tablet, Take 1 tablet (20 mg) by mouth once daily., Disp: , Rfl:   •  Multaq 400 mg tablet, Take 1 tablet (400 mg) by mouth every 12 hours. Take with food., Disp: , Rfl:   •  omega 3-dha-epa-fish oil (Fish OiL) 1,000 (120-180) mg capsule, Take 1 capsule (1,000 mg) by mouth 3 times a day., Disp: , Rfl:   •  rosuvastatin (Crestor) 40 mg tablet, Take 1 tablet (40 mg) by mouth once daily at bedtime., Disp: 90 tablet, Rfl: 3  •  sertraline (Zoloft) 50 mg tablet, Take 1 tablet (50 mg) by mouth once daily., Disp: , Rfl:   •  triamterene-hydrochlorothiazid (Maxzide-25) 37.5-25 mg tablet, Take 1 tablet by mouth once daily., Disp: , Rfl:   •  Xarelto 20 mg tablet, 1 tablet (20 mg) early in the morning.., Disp: , Rfl:   •  tiZANidine (Zanaflex) 4 mg tablet, Take 1 tablet (4 mg) by mouth once daily as needed for muscle spasms., Disp: , Rfl:                      Assessment/Plan   1. Paroxysmal atrial fibrillation (Multi)  ECG 12 Lead      2. Long term current use of anticoagulant therapy        3. Elevated coronary artery calcium score        4. Essential hypertension  Follow Up In Cardiology      5. Mixed hyperlipidemia        6. Coronary artery disease involving native coronary artery of native heart without angina pectoris        7. BMI 37.0-37.9, adult        8. Class 2 obesity        9. High risk medication use        10. Sleep apnea, unspecified type        11. Other fatigue                 Scribe Attestation  By signing my  name below, I, Sejal Hernadez LPNibrosalia   attest that this documentation has been prepared under the direction and in the presence of Dax Steele MD.     Provider Attestation - Scribe documentation    All medical record entries made by the Scribe were at my direction and personally dictated by me. I have reviewed the chart and agree that the record accurately reflects my personal performance of the history, physical exam, discussion and plan.

## 2025-01-02 NOTE — PROGRESS NOTES
Subjective   Melania Mora is a 79 y.o. female       Chief Complaint    Follow-up          HPI   Patient is in the office for follow-up for the problems noted below.  Earlier in 2024 she had a cardiac catheterization that revealed moderate disease in the coronary arteries with no high-grade stenosis that needed intervention.  Since her last visit and stopping the beta-blocker therapy her fatigue has improved but she continued to have symptoms suggestive of sleep apnea for which I suggested sleep study.  She is scheduled for blood work in the near future.  She is complaining of bulging disks in her lower back and MRI is scheduled.  Pain management is on the case.  Her weight has been increasing since she has been unable to exercise.  Her blood pressure is under control.  There has been no indication of breakthrough atrial fibrillation while she is on Multaq.  Her blood pressure is under control.  Her medical therapy has been well-tolerated, there has been no bleeding complications on anticoagulation with Xarelto.    Assessment/recommendations:     1-essential hypertension, currently under control on medical therapy as noted.   2-paroxysmal atrial fibrillation confirmed by event monitor for which she has been on Multaq which has been well-tolerated along with anticoagulation with Xarelto..  3-severe dyslipidemia with excellent response to combination of rosuvastatin and ezetimibe.  Well-tolerated with no side effects.  Lipid profile is scheduled  4-moderate coronary atherosclerosis based on cardiac catheterization May 2024.  Continue antiplatelet therapy and aggressive risk factors modification which have been very effective and the patient remains asymptomatic  5-class II obesity, encouraged the patient to work harder on cutting back on calorie consumption and control her weight with exercise as well.  6-high risk medication with antiarrhythmics and anticoagulants to be monitored  7-fatigue, somnolence and snoring  "all of which increase the probability of obstructive sleep apnea for which sleep study at home was suggested  Review of Systems   All other systems reviewed and are negative.           Vitals:    01/02/25 0937   BP: 124/72   BP Location: Left arm   Patient Position: Sitting   Pulse: 60   Weight: 106 kg (234 lb 3.2 oz)   Height: 1.676 m (5' 6\")      EKG done in office today   Objective   Physical Exam  Constitutional:       Appearance: Normal appearance.   HENT:      Nose: Nose normal.   Neck:      Vascular: No carotid bruit.   Cardiovascular:      Rate and Rhythm: Normal rate.      Pulses: Normal pulses.      Heart sounds: Normal heart sounds.   Pulmonary:      Effort: Pulmonary effort is normal.   Abdominal:      General: Bowel sounds are normal.      Palpations: Abdomen is soft.   Musculoskeletal:         General: Normal range of motion.      Cervical back: Normal range of motion.      Right lower leg: No edema.      Left lower leg: No edema.   Skin:     General: Skin is warm and dry.   Neurological:      General: No focal deficit present.      Mental Status: She is alert.   Psychiatric:         Mood and Affect: Mood normal.         Behavior: Behavior normal.         Thought Content: Thought content normal.         Judgment: Judgment normal.         Allergies  Morphine and Statins-hmg-coa reductase inhibitors     Current Medications    Current Outpatient Medications:     acetaminophen (Tylenol) 325 mg tablet, Take 1 tablet (325 mg) by mouth every 6 hours if needed for mild pain (1 - 3)., Disp: , Rfl:     amLODIPine (Norvasc) 10 mg tablet, Take 1 tablet (10 mg) by mouth once daily., Disp: 90 tablet, Rfl: 3    aspirin 81 mg EC tablet, Take 1 tablet (81 mg) by mouth 2 times a week., Disp: , Rfl:     ezetimibe (Zetia) 10 mg tablet, Take 1 tablet (10 mg) by mouth early in the morning.., Disp: , Rfl:     lisinopril 20 mg tablet, Take 1 tablet (20 mg) by mouth once daily., Disp: , Rfl:     Multaq 400 mg tablet, Take 1 " tablet (400 mg) by mouth every 12 hours. Take with food., Disp: , Rfl:     omega 3-dha-epa-fish oil (Fish OiL) 1,000 (120-180) mg capsule, Take 1 capsule (1,000 mg) by mouth 3 times a day., Disp: , Rfl:     rosuvastatin (Crestor) 40 mg tablet, Take 1 tablet (40 mg) by mouth once daily at bedtime., Disp: 90 tablet, Rfl: 3    sertraline (Zoloft) 50 mg tablet, Take 1 tablet (50 mg) by mouth once daily., Disp: , Rfl:     triamterene-hydrochlorothiazid (Maxzide-25) 37.5-25 mg tablet, Take 1 tablet by mouth once daily., Disp: , Rfl:     Xarelto 20 mg tablet, 1 tablet (20 mg) early in the morning.., Disp: , Rfl:     tiZANidine (Zanaflex) 4 mg tablet, Take 1 tablet (4 mg) by mouth once daily as needed for muscle spasms., Disp: , Rfl:                      Assessment/Plan   1. Paroxysmal atrial fibrillation (Multi)  ECG 12 Lead      2. Long term current use of anticoagulant therapy        3. Elevated coronary artery calcium score        4. Essential hypertension  Follow Up In Cardiology      5. Mixed hyperlipidemia        6. Coronary artery disease involving native coronary artery of native heart without angina pectoris        7. BMI 37.0-37.9, adult        8. Class 2 obesity        9. High risk medication use        10. Sleep apnea, unspecified type        11. Other fatigue                 Scribe Attestation  By signing my name below, ICandace LPN, Scribe   attest that this documentation has been prepared under the direction and in the presence of Dax Steele MD.     Provider Attestation - Scribe documentation    All medical record entries made by the Scribe were at my direction and personally dictated by me. I have reviewed the chart and agree that the record accurately reflects my personal performance of the history, physical exam, discussion and plan.

## 2025-03-17 ENCOUNTER — TELEPHONE (OUTPATIENT)
Dept: CARDIOLOGY | Facility: CLINIC | Age: 80
End: 2025-03-17
Payer: MEDICARE

## 2025-03-17 NOTE — TELEPHONE ENCOUNTER
[facility], Dr. Hoskins's sent a fax to request POC. Dr. Capellan 3/31/2025 left total knee     3/25/25 3 30 visit given.    Will also need asa/xarelto instructions.  Fax number [phone]

## 2025-03-20 NOTE — TELEPHONE ENCOUNTER
Dr Capellan office phones inquiring on status of cardiac clearance. They state they were going to move patient' surgery up to 3/24/25. Advised pt is coming in for OV 3/25 for cardiac clearance. Dr Capellan office verbalized understanding and states pt will proceed with surgery at later date of 3/31.

## 2025-03-25 ENCOUNTER — APPOINTMENT (OUTPATIENT)
Dept: CARDIOLOGY | Facility: CLINIC | Age: 80
End: 2025-03-25
Payer: MEDICARE

## 2025-03-25 VITALS
HEART RATE: 58 BPM | WEIGHT: 237 LBS | BODY MASS INDEX: 38.09 KG/M2 | HEIGHT: 66 IN | SYSTOLIC BLOOD PRESSURE: 122 MMHG | DIASTOLIC BLOOD PRESSURE: 60 MMHG

## 2025-03-25 DIAGNOSIS — Z01.818 PRE-OPERATIVE CLEARANCE: Primary | ICD-10-CM

## 2025-03-25 PROCEDURE — 3078F DIAST BP <80 MM HG: CPT | Performed by: NURSE PRACTITIONER

## 2025-03-25 PROCEDURE — 93000 ELECTROCARDIOGRAM COMPLETE: CPT | Performed by: NURSE PRACTITIONER

## 2025-03-25 PROCEDURE — 3074F SYST BP LT 130 MM HG: CPT | Performed by: NURSE PRACTITIONER

## 2025-03-25 PROCEDURE — 1159F MED LIST DOCD IN RCRD: CPT | Performed by: NURSE PRACTITIONER

## 2025-03-25 PROCEDURE — 1160F RVW MEDS BY RX/DR IN RCRD: CPT | Performed by: NURSE PRACTITIONER

## 2025-03-25 PROCEDURE — 99213 OFFICE O/P EST LOW 20 MIN: CPT | Performed by: NURSE PRACTITIONER

## 2025-03-25 RX ORDER — VIT C/E/ZN/COPPR/LUTEIN/ZEAXAN 250MG-90MG
125 CAPSULE ORAL DAILY
COMMUNITY

## 2025-03-25 NOTE — LETTER
"March 26, 2025     Carlos Malcolm DO  [address]  [place] [address]    Patient: Melania Mora   YOB: 1945   Date of Visit: 3/25/2025       Dear Dr. Carlos Malcolm DO:    Thank you for referring Melania Mora to me for evaluation. Below are my notes for this consultation.  If you have questions, please do not hesitate to call me. I look forward to following your patient along with you.       Sincerely,     Michaela Aragon, APRN-CNP      CC: No Recipients  ______________________________________________________________________________________    Chief Complaint  \"I need to get surgery and need Cardiology to say it is ok\"    Reason for Visit  Patient presents to [facility] [place]  to obtain cardiac risk stratification prior to a LTKR.  Surgeon: Dr. Capellan  Planned date:  TBD    History of Present Illness:     She presents to the office today ambulatory with cane and steady gait.  Last evaluated in clinic Dr. Steele January 2025.  She presents to the office today with no voiced cardiovascular concerns.  She remains aerobically active with no change in symptoms.    Prior cardiovascular history:  May 2024 cardiac cath: Mid LAD 40%, circumflex 40%, RCA luminal regularities.  LVEF 60 to 65%.  No significant valvular heart disease  PAF maintaining NSR on Multaq  CHADS VASc 4 chronically anticoagulated on Eliquis    Patient presents to the office today with activity level  > 4 METS.  Daily activity includes housework, her laundry is in the basement, yard work.  Total DASI: 23.45  METS: 5.62    EKG in office: Normal sinus rhythm, no ischemia    ACC/AHA guidelines:    1.  Major clinical markers:  -Acute coronary syndrome or MI within 30 days: No  -Decompensated heart failure: No  -Significant arrhythmia: No  -Severe valvular heart disease: No    2.  Intermediate clinical markers  -History of ischemic heart disease (prior MI, current chest pain secondary to ischemia, use of nitrates " "or EKG changes): No  -Compensated/prior heart failure: No  -Diabetes requiring insulin: No  -Renal insufficiency with creatinine greater than 2: No    3.  Minor clinical markers:  -Age greater than or equal to 70: Yes  -Abnormal EKG: No  -Rhythm other than sinus rhythm: No  -Low functional capacity: No  -Prior CVA: No  -Uncontrolled hypertension: No    Surgery specific risk: Intermediate orthopedic repair    According to ACC/AHA guidelines, a patient with recent favorable coronary evaluation (May 2024 cardiac cath moderate CAD and normal LVEF) without change in symptoms may proceed to operating room without additional cardiovascular testing.    LIUDMILA Revised Cardiac Risk Index: Very low risk 0.4% mace    Concomitant medication review:  No contraindication to interrupt DOAC as requested     Patient reports that overall has no complaint(s) of chest pain, chest pressure/discomfort, claudication, dyspnea, exertional chest pressure/discomfort, fatigue, and irregular heart beat    Review of Systems   Cardiovascular:  Negative for chest pain, dyspnea on exertion, irregular heartbeat, leg swelling, near-syncope, orthopnea, palpitations, paroxysmal nocturnal dyspnea and syncope.        Visit Vitals  /60 (BP Location: Left arm, Patient Position: Sitting)   Pulse 58   Ht 1.676 m (5' 6\")   Wt 108 kg (237 lb)   BMI 38.25 kg/m²   Smoking Status Never   BSA 2.24 m²     Physical Exam  Vitals and nursing note reviewed.   HENT:      Head: Normocephalic.   Cardiovascular:      Rate and Rhythm: Normal rate and regular rhythm.      Heart sounds: Normal heart sounds.   Pulmonary:      Effort: Pulmonary effort is normal.      Breath sounds: Normal breath sounds.   Abdominal:      Palpations: Abdomen is soft.   Musculoskeletal:      Right lower leg: No edema.      Left lower leg: No edema.   Skin:     General: Skin is warm and dry.   Neurological:      General: No focal deficit present.      Mental Status: She is alert.   Psychiatric:  "        Mood and Affect: Mood normal.         Behavior: Behavior normal.        Allergies   Allergen Reactions   • Morphine Nausea/vomiting   • Statins-Hmg-Coa Reductase Inhibitors Myalgia       Current Outpatient Medications   Medication Instructions   • acetaminophen (TYLENOL) 325 mg, Every 6 hours PRN   • amLODIPine (NORVASC) 10 mg, oral, Daily   • aspirin 81 mg, oral, 2 times weekly   • cholecalciferol (VITAMIN D-3) 125 mcg, Daily   • ezetimibe (Zetia) 10 mg tablet 1 tablet, Daily (0630)   • lisinopril 20 mg, Daily   • Multaq 400 mg, Every 12 hours   • omega 3-dha-epa-fish oil (Fish OiL) 1,000 (120-180) mg capsule 1 capsule, 3 times daily   • rosuvastatin (CRESTOR) 40 mg, oral, Nightly   • sertraline (ZOLOFT) 50 mg, Daily   • tiZANidine (ZANAFLEX) 4 mg, Daily PRN   • triamterene-hydrochlorothiazid (Maxzide-25) 37.5-25 mg tablet 1 tablet, Daily   • Xarelto 20 mg, Daily      Assessment:    A pleasant 79-year-old with moderate CAD and normal LVEF, PAF maintaining sinus rhythm on anticoagulation presents requesting cardiac risk stratification prior to intermediate surgical risk orthopedic repair.  In light of favorable cardiovascular testing and no change in symptoms there are no prohibitive cardiovascular risk to proceed with much-needed surgical intervention.  Procedure risk discussed with patient.      Plan:     Through informed decision making process incorporating patients unique circumstances, the following treatment plan will be initiated:    1.  Prescription drug management of cardiovascular medication for efficacy, adherence to treatment, side effect assessment and polypharmacy. Current treatment clinically warranted and to continue without modifications.    2. Return for follow-up; in the interim, contact the office if new symptoms arise.  Dr. Steele as scheduled    Michaela Aragon MSN, APRN-CNP, PMHNP-[facility] [place]  [place], [place]     Please excuse any  errors in grammar or translation related to this dictation. Voice recognition software was utilized to prepare this document.

## 2025-03-25 NOTE — PATIENT INSTRUCTIONS
Please bring all medicines, vitamins, and herbal supplements with you when you come to the office.    Prescriptions will not be filled unless you are compliant with your follow up appointments or have a follow up appointment scheduled as per instruction of your physician. Refills should be requested at the time of your visit.     PLAN:   Through informed decision making process incorporating patients unique circumstances, the following treatment plan will be initiated:    1.  Prescription drug management of cardiovascular medication for efficacy, adherence to treatment, side effect assessment and polypharmacy. Current treatment clinically warranted and to continue without modifications.    2. Return for follow-up; in the interim, contact the office if new symptoms arise.  Dr. Steele as scheduled    3.  Referral to weight management clinic at [facility]

## 2025-03-26 ASSESSMENT — ENCOUNTER SYMPTOMS
PND: 0
DYSPNEA ON EXERTION: 0
ORTHOPNEA: 0
SYNCOPE: 0
PALPITATIONS: 0
NEAR-SYNCOPE: 0
IRREGULAR HEARTBEAT: 0

## 2025-03-26 NOTE — PROGRESS NOTES
"Chief Complaint  \"I need to get surgery and need Cardiology to say it is ok\"    Reason for Visit  Patient presents to [facility] [place]  to obtain cardiac risk stratification prior to a LTKR.  Surgeon: Dr. Capellan  Planned date:  TBD    History of Present Illness:     She presents to the office today ambulatory with cane and steady gait.  Last evaluated in clinic Dr. Steele January 2025.  She presents to the office today with no voiced cardiovascular concerns.  She remains aerobically active with no change in symptoms.    Prior cardiovascular history:  May 2024 cardiac cath: Mid LAD 40%, circumflex 40%, RCA luminal regularities.  LVEF 60 to 65%.  No significant valvular heart disease  PAF maintaining NSR on Multaq  CHADS VASc 4 chronically anticoagulated on Eliquis    Patient presents to the office today with activity level  > 4 METS.  Daily activity includes housework, her laundry is in the basement, yard work.  Total DASI: 23.45  METS: 5.62    EKG in office: Normal sinus rhythm, no ischemia    ACC/AHA guidelines:    1.  Major clinical markers:  -Acute coronary syndrome or MI within 30 days: No  -Decompensated heart failure: No  -Significant arrhythmia: No  -Severe valvular heart disease: No    2.  Intermediate clinical markers  -History of ischemic heart disease (prior MI, current chest pain secondary to ischemia, use of nitrates or EKG changes): No  -Compensated/prior heart failure: No  -Diabetes requiring insulin: No  -Renal insufficiency with creatinine greater than 2: No    3.  Minor clinical markers:  -Age greater than or equal to 70: Yes  -Abnormal EKG: No  -Rhythm other than sinus rhythm: No  -Low functional capacity: No  -Prior CVA: No  -Uncontrolled hypertension: No    Surgery specific risk: Intermediate orthopedic repair    According to ACC/AHA guidelines, a patient with recent favorable coronary evaluation (May 2024 cardiac cath moderate CAD and normal LVEF) without change in symptoms may " "proceed to operating room without additional cardiovascular testing.    LIUDMILA Revised Cardiac Risk Index: Very low risk 0.4% mace    Concomitant medication review:  No contraindication to interrupt DOAC as requested     Patient reports that overall has no complaint(s) of chest pain, chest pressure/discomfort, claudication, dyspnea, exertional chest pressure/discomfort, fatigue, and irregular heart beat    Review of Systems   Cardiovascular:  Negative for chest pain, dyspnea on exertion, irregular heartbeat, leg swelling, near-syncope, orthopnea, palpitations, paroxysmal nocturnal dyspnea and syncope.        Visit Vitals  /60 (BP Location: Left arm, Patient Position: Sitting)   Pulse 58   Ht 1.676 m (5' 6\")   Wt 108 kg (237 lb)   BMI 38.25 kg/m²   Smoking Status Never   BSA 2.24 m²     Physical Exam  Vitals and nursing note reviewed.   HENT:      Head: Normocephalic.   Cardiovascular:      Rate and Rhythm: Normal rate and regular rhythm.      Heart sounds: Normal heart sounds.   Pulmonary:      Effort: Pulmonary effort is normal.      Breath sounds: Normal breath sounds.   Abdominal:      Palpations: Abdomen is soft.   Musculoskeletal:      Right lower leg: No edema.      Left lower leg: No edema.   Skin:     General: Skin is warm and dry.   Neurological:      General: No focal deficit present.      Mental Status: She is alert.   Psychiatric:         Mood and Affect: Mood normal.         Behavior: Behavior normal.        Allergies   Allergen Reactions    Morphine Nausea/vomiting    Statins-Hmg-Coa Reductase Inhibitors Myalgia       Current Outpatient Medications   Medication Instructions    acetaminophen (TYLENOL) 325 mg, Every 6 hours PRN    amLODIPine (NORVASC) 10 mg, oral, Daily    aspirin 81 mg, oral, 2 times weekly    cholecalciferol (VITAMIN D-3) 125 mcg, Daily    ezetimibe (Zetia) 10 mg tablet 1 tablet, Daily (0630)    lisinopril 20 mg, Daily    Multaq 400 mg, Every 12 hours    omega 3-dha-epa-fish oil " (Fish OiL) 1,000 (120-180) mg capsule 1 capsule, 3 times daily    rosuvastatin (CRESTOR) 40 mg, oral, Nightly    sertraline (ZOLOFT) 50 mg, Daily    tiZANidine (ZANAFLEX) 4 mg, Daily PRN    triamterene-hydrochlorothiazid (Maxzide-25) 37.5-25 mg tablet 1 tablet, Daily    Xarelto 20 mg, Daily      Assessment:    A pleasant 79-year-old with moderate CAD and normal LVEF, PAF maintaining sinus rhythm on anticoagulation presents requesting cardiac risk stratification prior to intermediate surgical risk orthopedic repair.  In light of favorable cardiovascular testing and no change in symptoms there are no prohibitive cardiovascular risk to proceed with much-needed surgical intervention.  Procedure risk discussed with patient.      Plan:     Through informed decision making process incorporating patients unique circumstances, the following treatment plan will be initiated:    1.  Prescription drug management of cardiovascular medication for efficacy, adherence to treatment, side effect assessment and polypharmacy. Current treatment clinically warranted and to continue without modifications.    2. Return for follow-up; in the interim, contact the office if new symptoms arise.  Dr. Steele as scheduled    Michaela Aragon MSN, APRN-CNP, PMHNP-[facility] [place]  [place], [place]     Please excuse any errors in grammar or translation related to this dictation. Voice recognition software was utilized to prepare this document.

## 2025-06-06 DIAGNOSIS — E78.2 MIXED HYPERLIPIDEMIA: ICD-10-CM

## 2025-06-06 DIAGNOSIS — I48.0 PAROXYSMAL ATRIAL FIBRILLATION (MULTI): ICD-10-CM

## 2025-06-06 DIAGNOSIS — I10 ESSENTIAL HYPERTENSION: ICD-10-CM

## 2025-06-06 RX ORDER — RIVAROXABAN 20 MG/1
20 TABLET, FILM COATED ORAL DAILY
Qty: 90 TABLET | Refills: 3 | Status: SHIPPED | OUTPATIENT
Start: 2025-06-06

## 2025-06-06 RX ORDER — ROSUVASTATIN CALCIUM 40 MG/1
40 TABLET, COATED ORAL NIGHTLY
Qty: 90 TABLET | Refills: 3 | Status: SHIPPED | OUTPATIENT
Start: 2025-06-06 | End: 2026-06-06

## 2025-06-06 RX ORDER — DRONEDARONE 400 MG/1
400 TABLET, FILM COATED ORAL EVERY 12 HOURS
Qty: 180 TABLET | Refills: 3 | Status: SHIPPED | OUTPATIENT
Start: 2025-06-06

## 2025-06-06 RX ORDER — EZETIMIBE 10 MG/1
10 TABLET ORAL
Qty: 90 TABLET | Refills: 3 | Status: SHIPPED | OUTPATIENT
Start: 2025-06-06 | End: 2026-06-06

## 2025-07-08 ENCOUNTER — TELEPHONE (OUTPATIENT)
Dept: CARDIOLOGY | Facility: CLINIC | Age: 80
End: 2025-07-08
Payer: MEDICARE

## 2025-07-08 NOTE — TELEPHONE ENCOUNTER
Dr. Malcolm office phones stating she as seen by him yesterday and he took her off of lisinopril and hydrochlorothiazide due to kidney concerns. Reports her bp was checked in the office yesterday and it was excellent, she was instructed to monitor bp daily at home.     Reports they will send any recent labs over to office.     Med list updated.

## 2025-08-08 DIAGNOSIS — E78.2 MIXED HYPERLIPIDEMIA: ICD-10-CM

## 2025-08-08 RX ORDER — EZETIMIBE 10 MG/1
10 TABLET ORAL
Qty: 90 TABLET | Refills: 4 | Status: SHIPPED | OUTPATIENT
Start: 2025-08-08

## 2025-08-20 ENCOUNTER — APPOINTMENT (OUTPATIENT)
Dept: CARDIOLOGY | Facility: CLINIC | Age: 80
End: 2025-08-20
Payer: MEDICARE

## 2025-09-08 ENCOUNTER — APPOINTMENT (OUTPATIENT)
Dept: CARDIOLOGY | Facility: CLINIC | Age: 80
End: 2025-09-08
Payer: MEDICARE

## 2025-11-26 ENCOUNTER — APPOINTMENT (OUTPATIENT)
Dept: CARDIOLOGY | Facility: CLINIC | Age: 80
End: 2025-11-26
Payer: MEDICARE